# Patient Record
(demographics unavailable — no encounter records)

---

## 2025-06-25 NOTE — ASSESSMENT
[FreeTextEntry1] : R ETD - 1 acute complicated injury - R AOM resolved with amoxicillin but patient has lingering hearing loss on the right - unclear if SNHL vs ETD - pt declined audiogram today - medrol dose pack sent to pharmacy - The potential side effects of corticosteroid use were discussed at length with the patient which include but are not limited to: increased appetite, insomnia, fluid retention, mood swings, weight gain, change in blood pressure, increased blood glucose levels, osteoporosis, avascular necrosis of the hip, menstrual irregularities (if applicable), stomach ulcers/bleeding, glaucoma, and cataracts. The patient understands these risks and is willing to proceed with oral steroid therapy. - Take the steroid first thing in the morning with a meal - If you begin to have pain in your hip, you may have avascular necrosis of the hip which is a serious side-effect. Notify the office immediately and cease vigorous physical activity - f/u in 1-2 weeks with audiogram if patient is not feeling his right ear is improving  Bilateral inferior turbinate hypertrophy - no acute intervention

## 2025-06-25 NOTE — PHYSICAL EXAM
[TextEntry] : General: AAO, no significant distress Psychiatric: affect pleasant and within normal limits Eyes: relatively symmetric, no obvious nystagmus Skin: no significant lesions on face Nose: no significant lesions; patent. Oral Cavity & Oropharynx: no significant deformity or lesions Neck/Lymphatics: no significant masses or abnormal cervical nodes palpated Respiratory: breathing comfortably; no significant distress Neurologic: cranial nerves II-XII grossly intact; EOMI Facial function: symmetric   Ear examination performed under binocular otologic microscope: Left Ear External: Pinna and periauricular area is normal. Canal: Ear canal skin is not inflamed or edematous. Tympanic Membrane: Intact and in good position.   Right Ear External: Pinna and periauricular area is normal. Canal: Ear canal skin is not inflamed or edematous. Tympanic Membrane: Intact and in good position.  AC>BC bilaterally, conde midline  Nasal Endoscopy:   Pre-operative Diagnosis: eustachian tube dysfunction Post-operative Diagnosis: same + bilateral inferior turbinate hypertrophy Anesthesia: None Procedure: Bilateral nasal endoscopy Procedure Details:   The patient was placed in the seated position sitting straight up. The telescope was passed along the left nasal floor to the nasopharynx. It was then passed into the region of the middle meatus, middle turbinate, and the sphenoethmoid region. An identical procedure was performed on the right side.   Findings: Interior nasal cavity: normal bilaterally Middle and superior meatus: normal bilaterally Sphenoethmoidal recess: normal bilaterally Mucosa: normal bilaterally Nasal septum: normal Discharge: none bilaterally Turbinates: ENLARGED AND BOGGY INFERIOR TURBINATES BILATERALLY Adenoid: normal bilaterally Posterior choanae: normal bilaterally Eustachian tubes: normal bilaterally Mucous stranding: normal bilaterally Lesions: Not present     Comments:   Condition: Stable. Patient tolerated procedure well.